# Patient Record
Sex: FEMALE | Race: WHITE | NOT HISPANIC OR LATINO | Employment: OTHER | URBAN - METROPOLITAN AREA
[De-identification: names, ages, dates, MRNs, and addresses within clinical notes are randomized per-mention and may not be internally consistent; named-entity substitution may affect disease eponyms.]

---

## 2020-05-21 ENCOUNTER — HOSPITAL ENCOUNTER (EMERGENCY)
Facility: HOSPITAL | Age: 50
Discharge: HOME | End: 2020-05-21
Attending: EMERGENCY MEDICINE | Admitting: EMERGENCY MEDICINE

## 2020-05-21 VITALS
OXYGEN SATURATION: 96 % | WEIGHT: 215 LBS | HEART RATE: 79 BPM | DIASTOLIC BLOOD PRESSURE: 98 MMHG | TEMPERATURE: 98.3 F | RESPIRATION RATE: 18 BRPM | BODY MASS INDEX: 33.74 KG/M2 | SYSTOLIC BLOOD PRESSURE: 154 MMHG | HEIGHT: 67 IN

## 2020-05-21 DIAGNOSIS — H72.91 ACUTE OTITIS MEDIA OF RIGHT EAR WITH PERFORATION: Primary | ICD-10-CM

## 2020-05-21 DIAGNOSIS — H66.91 ACUTE OTITIS MEDIA OF RIGHT EAR WITH PERFORATION: Primary | ICD-10-CM

## 2020-05-21 PROCEDURE — 99283 EMERGENCY DEPT VISIT LOW MDM: CPT

## 2020-05-21 RX ORDER — CYCLOBENZAPRINE HCL 10 MG
10 TABLET ORAL 3 TIMES DAILY PRN
COMMUNITY

## 2020-05-21 RX ORDER — CEFDINIR 300 MG/1
300 CAPSULE ORAL 2 TIMES DAILY
Qty: 20 CAPSULE | Refills: 0 | Status: SHIPPED | OUTPATIENT
Start: 2020-05-21

## 2020-05-21 RX ORDER — LISINOPRIL 10 MG/1
10 TABLET ORAL DAILY
COMMUNITY

## 2020-05-21 RX ORDER — LITHIUM CARBONATE 150 MG/1
200 CAPSULE ORAL 2 TIMES DAILY WITH MEALS
COMMUNITY

## 2020-05-21 NOTE — ED NOTES
Pt complains of L ear and headache since Tuesday. Pt complains of drainage and a cough, and has hx of COPD. Pt ambulated to ED, currently A&OX4, and in a mask at this time.      Cuong Pires, RN  05/21/20 1900

## 2020-05-21 NOTE — ED PROVIDER NOTES
EMERGENCY DEPARTMENT ENCOUNTER    Room Number:    Date of encounter:  2020  PCP: Provider, No Known  Historian: Patient      HPI:  Chief Complaint: Ear pain  A complete HPI/ROS/PMH/PSH/SH/FH are unobtainable due to: Nothing    Context: Vanessa Flores is a 50 y.o. female who presents to the ED c/o 2-day history of gradual onset, progressive, constant right-sided earache as well as right-sided headache.  The headache started gradually as well.  Patient has had some purulent drainage out of her right ear.  Patient denies any fever.  Patient does have COPD and describes a chronic cough, however she denies any shortness of breath.  Patient states she frequently gets ear infections and has had tubes in the past.  She states she has a chronically perforated right TM.  She does not follow with an ear nose and throat doctor at this time.  Patient denies any precipitating or alleviating factors.      Review of Medical Records  Reviewed last ER visit from 10/18/2019.  Patient was seen in the ER for cocaine abuse.    PAST MEDICAL HISTORY  Active Ambulatory Problems     Diagnosis Date Noted   • No Active Ambulatory Problems     Resolved Ambulatory Problems     Diagnosis Date Noted   • No Resolved Ambulatory Problems     Past Medical History:   Diagnosis Date   • Bipolar 1 disorder (CMS/HCC)    • COPD (chronic obstructive pulmonary disease) (CMS/HCC)    • Diabetes mellitus (CMS/HCC)    • Disease of thyroid gland    • Fibromyalgia    • Hypertension    • PTSD (post-traumatic stress disorder)          PAST SURGICAL HISTORY  Past Surgical History:   Procedure Laterality Date   • BACK SURGERY     • BUNIONECTOMY     •  SECTION     • HYSTERECTOMY     • KNEE SURGERY     • WRIST SURGERY           FAMILY HISTORY  History reviewed. No pertinent family history.      SOCIAL HISTORY  Social History     Socioeconomic History   • Marital status:      Spouse name: Not on file   • Number of children: Not on file   • Years  of education: Not on file   • Highest education level: Not on file   Tobacco Use   • Smoking status: Current Every Day Smoker     Packs/day: 0.50     Types: Cigarettes         ALLERGIES  Lodine [etodolac]; Penicillins; Sulfa antibiotics; and Tramadol        REVIEW OF SYSTEMS  All systems reviewed and negative except for those discussed in HPI.       PHYSICAL EXAM    I have reviewed the triage vital signs and nursing notes.    ED Triage Vitals   Temp Heart Rate Resp BP SpO2   05/21/20 1901 05/21/20 1901 05/21/20 1901 05/21/20 1918 05/21/20 1901   98.3 °F (36.8 °C) 86 18 155/99 99 %      Temp src Heart Rate Source Patient Position BP Location FiO2 (%)   05/21/20 1901 -- -- -- --   Tympanic           Physical Exam  GENERAL: Mild distress, alert, oriented   HENT: Right external auditory canal shows purulent debris.  Right TM mildly erythematous with likely perforation.  Left external auditory canal normal, left TM normal  Oropharynx normal  EYES: no scleral icterus, EOMI  CV: regular rhythm, regular rate, no murmur  RESPIRATORY: normal effort, expiratory wheezing.  ABDOMEN: soft, nontender  MUSCULOSKELETAL: no deformity, FROM  NEURO: alert, moves all extremities, follows commands  SKIN: warm, dry      PROGRESS, DATA ANALYSIS, CONSULTS, AND MEDICAL DECISION MAKING    All labs have been independently reviewed by me.  All radiology studies have been reviewed by me and discussed with radiologist dictating the report.   EKG's independently viewed and interpreted by me.  Discussion below represents my analysis of pertinent findings related to patient's condition, differential diagnosis, treatment plan and final disposition.    I have discussed case with Dr. Mathews, emergency room physician.  He has performed his own bedside examination and agrees with treatment plan.    ED Course as of May 21 2209   Thu May 21, 2020   2019 Patient with otitis media with chronic perforation of right TM.  No evidence of malignant otitis media.   No mastoid tenderness.    [EE]      ED Course User Index  [EE] Stewart Montano PA       AS OF 22:09 VITALS:    BP - 154/98  HR - 79  TEMP - 98.3 °F (36.8 °C) (Tympanic)  O2 SATS - 96%        DIAGNOSIS  Final diagnoses:   Acute otitis media of right ear with perforation         DISPOSITION  Discharged             Stewart Montano PA  05/21/20 2869

## 2020-05-22 NOTE — ED PROVIDER NOTES
MD ATTESTATION NOTE    The MARCUS and I have discussed this patient's history, physical exam, and treatment plan.  I have reviewed the documentation and personally had a face to face interaction with the patient. I affirm the documentation and agree with the treatment and plan.  The attached note describes my personal findings.    Patient has chronic recurrent otitis of the right ear.  Patient said she had myringotomy and tube that 10 years ago which was then removed, and [place]  Apparently the last couple days has been draining more and its purulent.  She had no fevers, no change in hearing.    On exam is difficult to see her TM because of all the discharge, and there is purulent discharge.  We cannot discharge her on drops and antibiotics and have her follow-up with her ENT when she gets home.  There is no mastoid tenderness     Ephraim Mathews MD  05/21/20 0741